# Patient Record
Sex: MALE | Race: WHITE | NOT HISPANIC OR LATINO | Employment: OTHER | URBAN - METROPOLITAN AREA
[De-identification: names, ages, dates, MRNs, and addresses within clinical notes are randomized per-mention and may not be internally consistent; named-entity substitution may affect disease eponyms.]

---

## 2023-03-24 DIAGNOSIS — Z00.00 ENCOUNTER FOR GENERAL ADULT MEDICAL EXAMINATION WITHOUT ABNORMAL FINDINGS: ICD-10-CM

## 2023-03-24 RX ORDER — METOPROLOL TARTRATE 25 MG/1
TABLET, FILM COATED ORAL
Qty: 90 TABLET | Refills: 1 | Status: SHIPPED | OUTPATIENT
Start: 2023-03-24

## 2023-03-24 RX ORDER — AMLODIPINE BESYLATE 10 MG/1
TABLET ORAL
Qty: 90 TABLET | Refills: 1 | Status: SHIPPED | OUTPATIENT
Start: 2023-03-24

## 2024-06-05 ENCOUNTER — OFFICE VISIT (OUTPATIENT)
Dept: DERMATOLOGY | Facility: CLINIC | Age: 69
End: 2024-06-05
Payer: MEDICARE

## 2024-06-05 ENCOUNTER — OFFICE VISIT (OUTPATIENT)
Dept: OTOLARYNGOLOGY | Facility: CLINIC | Age: 69
End: 2024-06-05
Payer: MEDICARE

## 2024-06-05 VITALS — WEIGHT: 140 LBS | BODY MASS INDEX: 23.32 KG/M2 | HEIGHT: 65 IN

## 2024-06-05 DIAGNOSIS — L30.4 INTERTRIGO: ICD-10-CM

## 2024-06-05 DIAGNOSIS — K21.9 LARYNGOPHARYNGEAL REFLUX (LPR): ICD-10-CM

## 2024-06-05 DIAGNOSIS — J34.89 NASAL CRUSTING: ICD-10-CM

## 2024-06-05 DIAGNOSIS — J39.2 LESION OF THROAT: Primary | ICD-10-CM

## 2024-06-05 DIAGNOSIS — L57.0 ACTINIC KERATOSES: Primary | ICD-10-CM

## 2024-06-05 PROCEDURE — 1160F RVW MEDS BY RX/DR IN RCRD: CPT | Performed by: NURSE PRACTITIONER

## 2024-06-05 PROCEDURE — 17003 DESTRUCT PREMALG LES 2-14: CPT | Performed by: STUDENT IN AN ORGANIZED HEALTH CARE EDUCATION/TRAINING PROGRAM

## 2024-06-05 PROCEDURE — 99214 OFFICE O/P EST MOD 30 MIN: CPT | Performed by: NURSE PRACTITIONER

## 2024-06-05 PROCEDURE — 99204 OFFICE O/P NEW MOD 45 MIN: CPT | Performed by: STUDENT IN AN ORGANIZED HEALTH CARE EDUCATION/TRAINING PROGRAM

## 2024-06-05 PROCEDURE — 1159F MED LIST DOCD IN RCRD: CPT | Performed by: NURSE PRACTITIONER

## 2024-06-05 PROCEDURE — 17000 DESTRUCT PREMALG LESION: CPT | Performed by: STUDENT IN AN ORGANIZED HEALTH CARE EDUCATION/TRAINING PROGRAM

## 2024-06-05 PROCEDURE — 1159F MED LIST DOCD IN RCRD: CPT | Performed by: STUDENT IN AN ORGANIZED HEALTH CARE EDUCATION/TRAINING PROGRAM

## 2024-06-05 PROCEDURE — 31231 NASAL ENDOSCOPY DX: CPT | Performed by: NURSE PRACTITIONER

## 2024-06-05 RX ORDER — MICONAZOLE NITRATE 2 %
POWDER (GRAM) TOPICAL DAILY
Qty: 85 G | Refills: 11 | Status: SHIPPED | OUTPATIENT
Start: 2024-06-05

## 2024-06-05 RX ORDER — ALPRAZOLAM 0.5 MG/1
0.5 TABLET ORAL DAILY PRN
COMMUNITY

## 2024-06-05 RX ORDER — GABAPENTIN 100 MG/1
100 CAPSULE ORAL 2 TIMES DAILY
COMMUNITY
Start: 2024-04-05 | End: 2024-07-04

## 2024-06-05 RX ORDER — MUPIROCIN 20 MG/G
OINTMENT TOPICAL
Qty: 30 G | Refills: 0 | Status: SHIPPED | OUTPATIENT
Start: 2024-06-05

## 2024-06-05 RX ORDER — OMEPRAZOLE 20 MG/1
20 CAPSULE, DELAYED RELEASE ORAL
Qty: 30 CAPSULE | Refills: 3 | Status: SHIPPED | OUTPATIENT
Start: 2024-06-05 | End: 2024-10-03

## 2024-06-05 RX ORDER — HYDROCORTISONE 25 MG/G
CREAM TOPICAL 2 TIMES DAILY
Qty: 30 G | Refills: 3 | Status: SHIPPED | OUTPATIENT
Start: 2024-06-05

## 2024-06-05 RX ORDER — DORZOLAMIDE HYDROCHLORIDE AND TIMOLOL MALEATE 20; 5 MG/ML; MG/ML
1 SOLUTION/ DROPS OPHTHALMIC 2 TIMES DAILY
COMMUNITY
Start: 2024-05-24

## 2024-06-05 RX ORDER — KETOCONAZOLE 20 MG/G
CREAM TOPICAL 2 TIMES DAILY
Qty: 30 G | Refills: 11 | Status: SHIPPED | OUTPATIENT
Start: 2024-06-05

## 2024-06-05 ASSESSMENT — DERMATOLOGY QUALITY OF LIFE (QOL) ASSESSMENT
RATE HOW BOTHERED YOU ARE BY SYMPTOMS OF YOUR SKIN PROBLEM (EG, ITCHING, STINGING BURNING, HURTING OR SKIN IRRITATION): 1
DATE THE QUALITY-OF-LIFE ASSESSMENT WAS COMPLETED: 66996
RATE HOW BOTHERED YOU ARE BY EFFECTS OF YOUR SKIN PROBLEMS ON YOUR ACTIVITIES (EG, GOING OUT, ACCOMPLISHING WHAT YOU WANT, WORK ACTIVITIES OR YOUR RELATIONSHIPS WITH OTHERS): 1
ARE THERE EXCLUSIONS OR EXCEPTIONS FOR THE QUALITY OF LIFE ASSESSMENT: NO
WHAT SINGLE SKIN CONDITION LISTED BELOW IS THE PATIENT ANSWERING THE QUALITY-OF-LIFE ASSESSMENT QUESTIONS ABOUT: ACTINIC KERATOSIS
RATE HOW EMOTIONALLY BOTHERED YOU ARE BY YOUR SKIN PROBLEM (FOR EXAMPLE, WORRY, EMBARRASSMENT, FRUSTRATION): 2

## 2024-06-05 ASSESSMENT — PATIENT HEALTH QUESTIONNAIRE - PHQ9
1. LITTLE INTEREST OR PLEASURE IN DOING THINGS: NOT AT ALL
2. FEELING DOWN, DEPRESSED OR HOPELESS: NOT AT ALL
SUM OF ALL RESPONSES TO PHQ9 QUESTIONS 1 AND 2: 0

## 2024-06-05 ASSESSMENT — DERMATOLOGY PATIENT ASSESSMENT
ARE YOU AN ORGAN TRANSPLANT RECIPIENT: NO
DO YOU USE A TANNING BED: NO
DO YOU HAVE ANY NEW OR CHANGING LESIONS: NO
HAVE YOU HAD OR DO YOU HAVE A STAPH INFECTION: NO
HAVE YOU HAD OR DO YOU HAVE VASCULAR DISEASE: NO

## 2024-06-05 ASSESSMENT — ITCH NUMERIC RATING SCALE: HOW SEVERE IS YOUR ITCHING?: 8

## 2024-06-05 ASSESSMENT — PATIENT GLOBAL ASSESSMENT (PGA): PATIENT GLOBAL ASSESSMENT: PATIENT GLOBAL ASSESSMENT:  1 - CLEAR

## 2024-06-05 NOTE — PROGRESS NOTES
Subjective     Antoine Head is a 68 y.o. male who presents for the following: Hidradenitis Suppurativa (Pt states itchy and painful lesions on genitals usually go with otc cortizone for years but currently hasn't resolved. ).     Review of Systems:  No other skin or systemic complaints other than what is documented elsewhere in the note.    The following portions of the chart were reviewed this encounter and updated as appropriate:          Skin Cancer History  No skin cancer on file.      Specialty Problems    None       Objective   Well appearing patient in no apparent distress; mood and affect are within normal limits.    A focused skin examination was performed. All findings within normal limits unless otherwise noted below.    Assessment/Plan   1. Actinic keratoses (9)  Mid Parietal Scalp (8), Right Lower Back  Erythematous gritty macule(s)    Lesions are due to chronic, cumulative sun damage over time and are pre-cancerous. They have a risk of developing into squamous cell carcinoma and therefore treatment recommendations were offered and discussed with the patient. Discussed LN2 & topical chemotherapy creams, risks and benefits of each. The risks and benefits of LN2 were reviewed including incomplete removal, crusting, blister hypo and/or hyperpigmentation, scarring. The patient elected for LN2 today.    Destr of lesion - Mid Parietal Scalp (8), Right Lower Back  Complexity: simple    Destruction method: cryotherapy    Informed consent: discussed and consent obtained    Lesion destroyed using liquid nitrogen: Yes    Cryotherapy cycles:  1  Outcome: patient tolerated procedure well with no complications    Post-procedure details: wound care instructions given      2. Intertrigo  Left Inguinal Area, Right Inguinal Area  Macerated pink patches in bilateral inguinal fold    Discussed diagnosis, chronic nature of condition  Related to irritation due to friction and sweat  Usually with yeast superinfection    Start hydrocortisone 2.5% cream and ketoconazole 2% cream mix 1:1 and apply twice daily until clear  Then use miconazole powder daily to prevent recurrence    Related Procedures  Follow Up In Dermatology - Established Patient    Related Medications  hydrocortisone 2.5 % cream  Apply topically 2 times a day. Mix 1:1 with ketoconazole cream and apply twice daily until rash is clear    ketoconazole (NIZOral) 2 % cream  Apply topically 2 times a day. Mix 1:1 with hydrocortisone cream and apply twice daily until rash is clear    miconazole (Micotin) 2 % powder  Apply topically once daily. To skin folds

## 2024-06-05 NOTE — PATIENT INSTRUCTIONS
"Today you were evaluated by Leonor Funk CNP.    Please follow-up as needed. If you have any questions or concerns, please contact my office at [phone].     Resume omeprazole 30 minutes prior to breakfast daily.    - Begin Mupirocin ointment 3 times daily FOR 2-4 WEEKS as directed. Use the pads of your fingers to apply the ointment and then sniff back gently. Do not use a cue tip or finger nail to place the ointment as this can cause further trauma. IF THE PRESCRIBED OINTMENT IS TOO EXPENSIVE THEN JUST USE OVER THE COUNTER BACITRACIN OR TRIPLE ANTIBIOTIC OINTMENT.      LARYNGOPHARYNGEAL REFLUX (LPR) OR SILENT REFLUX  A common cause of hoarseness or voice changes is gastroesophageal reflux disease (GERD). GERD occurs when stomach acid flows back up into the esophagus (swallowing tube). When the acid reaches the throat, it is called laryngopharyngeal reflux (LPR) or silent reflux. It is called \"silent\" because many people with LPR have no heartburn or stomach upset.    Possible Signs and Symptoms:  Hoarseness, cough  Sensation of lump in the throat, Vocal fold swelling and irritation  Frequent throat clearing, Vocal fold lesions or ulcerations  Mucous sticking in the throat, Worsening of asthma  Low grade sore throat, Worsening of sinus drainage or post nasal drip  Lifestyle changes  · If you are overweight, talk with your health care provider about losing weight.   · If you smoke, quit! Your health care provider may have ideas to help you quit.   Dietary guidelines  · Eat smaller, more frequent meals rather than large one.   · Avoid food or liquids for 2-3 hours before lying down (no bedtime snacks!)   · Avoid or limit the following:   Caffeinated products: coffee, tea, sodas, chocolate  Red sauces and salsa  Fatty, fried, or greasy foods  Citric juices: orange, grapefruit  Spicy foods  Mints: peppermint, spearmint  Alcohol    Physical measures  · Elevate the head of the bed 6 inches by placing blocks or " thick books under the legs of the head of the bed. Using extra pillows is NOT a good alternative.   · Avoid eating ANYTHING for 3-4 hours before bedtime.   · Avoid bending forward at the waist.   · Avoid wearing tight fitting clothing.   Antireflux medications if recommended or prescribed need to be taken on an empty stomach, 30 minutes before meals. These medications act by preventing acid production, not by neutralizing acid already present in the stomach.    Changes in voice and hoarseness can be the result of a number of different causes. One of the more common causes is gastroesophageal reflux disease (GERD). GERD occurs when stomach acid regurgitates back up in the esophagus (swallowing tube). When the acid reaches the throat we refer to this as laryngopharyngeal reflux (LPR). This reflux can cause inflammation and swelling in the throat or in the larynx (voice box) and can result in a number of different symptoms. These include mild hoarseness which is typically worse in the morning, a sense of a foreign body or lump in the throat, a sense of mucous sticking, a need to frequently clear the throat. Oftentimes, LPR irritation in the throat and larynx is mistaken for sinus drainage because of the sensation of mucous and post nasal drip.    LPR tends to become more prominent as people age, is often related to the timing and type of the diet, and may be the source of chronic symptoms. LPR reflux may also be the cause of low grade sorethroat and chronic cough, particularly cough that wakes people up in the middle of the night.    Approximately 50% of people with significant LPR have no symptoms of heartburn or stomach upset.    The diagnosis of LPR as a cause of throat symptoms is usually based on classic symptoms and physical findings of inflammation in the throat in locations consistent with LPR (back part of the voicebox). Often the patient is treated for LPR without any further testing. More significant testing  is usually not needed. However, on some occasion, some additional tests may be required such as a swallowing study with barium, an endoscopic evaluation of the esophagus and stomach, or a probe that measures acidity (pH) in the throat and esophagus.    The most important treatment of LPR is dietary control. A somewhat bland diet, smaller but more frequent meals, avoidance of alcohol, tobacco and caffeine, and not eating within 3-4 hours of bedtime are the most important factors. Avoidance of acidic and fatty foods and mint are also important. Elevation of the head of the bed and avoidance of tight, binding clothing is of value. A person with reflux who is overweight should reduce weight, and reducing stress also frequently improves the symptoms.  Regular use of a recommended or prescribed medication for at least 2 months while observing dietary control is critical. Humidification, hydration, mucous thinners and avoidance of throat clearing are all of value for those people who have significant throat symptoms from gastroesophageal reflux.  Untreated LPR can lead to chronic swelling of the vocal folds, ulcerations of the vocal folds and formation of masses known as granulomas. LPR can also make asthma worse.

## 2024-06-05 NOTE — PROGRESS NOTES
Subjective   Patient ID: Antoine Head is a 68 y.o. male who presents for Follow-up and Sinusitis.  HPI  Antoine Head is a 68 y.o. old male   presenting with complaints of sinus and nose problems that began several years ago. He notes chronic crusting in his nose. However, over the last month, he has had some bleeding on the right side.   The patient describes the sinus/nose problems as gradual onset of difficulty with his sense of taste over the last month. Denies any viral illness. He does have nasal drainage, mostly posterior. He has intermittent obstruction. Patient denies facial pressure, rhinorrhea, facial pain, periorbital edema, throat clearing, hoarseness, and loss of smell. The patient denies epistaxis. The patient has not taken medications to alleviate the problem. The patient has not tried nasal saline irrigations. Does not have a history of allergic rhinitis or allergies. They deny a history of aspirin sensitivity. They report 0 sinus infections per year requiring antibiotic treatment. They deny recent antibiotic usage.   History of prior nasal/sinus surgery or procedure: Denies    I have also reviewed prior note from Dr. Patrick Low dated 2/16/22 and this is contributing to my history and assessment.     Review of Systems  Review of systems is negative for constitutional, ophthalmological, cardiac, pulmonary, renal, gastrointestinal, musculoskeletal, mental health, endocrine, or neurologic disorders (except as listed in the HPI, PMH, and Problem List).     Objective   Physical Exam  CONSTITUTIONAL: Vital signs reviewed. Patient appears well developed and well nourished.   GENERAL: this is a healthy appearing male who appears stated age. The patient is alert and appropriately verbally conversant without hoarseness. This patient is in no apparent distress.   FACE: The face was inspected and no cutaneous masses or lesions were visualized. There was no erythema or edema noted. Facial movement was  symmetric. No skin lesions were detected. There was no sinus tenderness elicited. TMJ crepitus absent.   EYES: Extra-ocular muscle function was intact. No nystagmus was observed. Pupils were equal.   CRANIAL NERVES: Cranial nerves II, III, IV, and VI were noted to be intact via extra-ocular muscle movement testing. Cranial nerve VII noted to be intact and symmetric by facial movement. Cranial nerves IX and X noted to be intact by gag reflex and palatal movement. Cranial nerve XII noted to be intact by active and symmetric tongue movement.   NOSE: Examination of the nose revealed the nasal dorsum to be midline. Intranasal exam reveals the septum is deviated right. The inferior turbinates were healthy. No masses, polyps, mucopus, or other lesion on anterior rhinoscopy. See below procedure note as applicable for further exam.  ORAL CAVITY: Examination of the oral cavity revealed no mass lesions nor infection. The palate was noted to be intact. The tongue exhibited normal mobility. Mucosa was moist without lesion. The lips were free of lesion. Gums were free of inflammation. Dentition: normal without obvious infection or inflammation  OROPHARYNX: The oral pharynx was free of mass lesion or mucosal abnormality. The palate was noted to be without lesion. The uvula was normal appearing. The tonsils were Absent. The posterior pharynx is with cobblestoning.  EARS: Examination of the ears revealed that the auricles were normally formed with no lesions. The external auditory canals were normal. The tympanic membranes were intact.  There is no inflammation visualized.   NECK: Visualization and palpation of the neck revealed no mass lesions. No skin lesions or inflammatory processes were detected. The cervical musculature was normal to palpation.   CERVICAL LYMPHATICS: There were no palpable lymph nodes in the posterior triangle, submandibular triangle, jugulodigastric region, or central neck.  RESPIRATORY: Normal inspiration  and expiration and chest wall expansion, no use of accessory muscles to breathe, no stridor.  NEUROLOGICAL: Patient is ambulatory without assist. Mentation is clear. Answering questions appropriately.     Nasal / sinus endoscopy    Date/Time: 6/5/2024 3:04 PM    Performed by: LEO Olivarez  Authorized by: LEO Olivarez    Consent:     Consent obtained:  Verbal    Consent given by:  Patient    Risks discussed:  Bleeding, infection and pain    Alternatives discussed:  No treatment, observation and delayed treatment  Procedure details:     Indications: assessment of airway and sino-nasal symptoms      Medication:  Afrin and Ronny-Synephrine 2%    Instrument: flexible fiberoptic nasal endoscope      Scope location: bilateral nare    Post-procedure details:     Patient tolerance of procedure:  Tolerated well, no immediate complications  Comments:      Findings: After topical decongestion with decongestant and anesthetic spray, nasal endoscopy was performed using an endoscope. The septum was deviated right. There is a crust along the right anterior septum. The inferior turbinates were hypertrophic.  The middle turbinates appeared healthy, the middle meatus is free of purulence, masses, lesions or polyps. The superior meatus and sphenoethmoid recess are clear bilaterally. The nasal passageway is patent. The nasopharynx was clear. On view of the larynx, there is cobblestoning in the posterior pharynx. There appears to be several cysts in the left lingual tonsillar tissue. Otherwise, the vocal cords and movement are normal. There were no complications and the patient tolerated the procedure well.        Assessment/Plan     Antoine Head is a 68 y.o. year old male with symptoms of post-nasal drainage, nasal crusting and clinical exam consistent with laryngopharyngeal reflux (LPR). He also has a slight right nasal septal deviation and a crust to the anterior aspect of the nasal septum and left  middle turbinate. Rec. Mupirocin ointment and omeprazole for the reflux. Discussed follow up with Dr. Low regarding persisting throat concerns.       PLAN:  1) Flexible laryngoscopy today. Evidence of erythema and cobblestoning to the posterior pharynx. Several cysts in the base of the tongue.  2) Pt was extensively counseled regarding lifestyle and dietary precautions that can be performed to improve LPR. This includes eating smaller more frequent meals, avoiding triggers such as caffeine, late night snacks, peppermint, spicy or citrus foods and drinks high in acid content. The pt was also apprised that elevating the head of bed may help. The patient was counseled on avoiding as much as possible NSAIDS and if taking them be sure to do so with a meal.  3) Omeprazole was also prescribed to be taken 30 minutes before meals to help limit the acid production.  4) I also recommended he begin mupirocin ointment, twice daily, by placing on the pad of the thumb and swiping into the nostril. Advised against the use of Q-tips in the nose.   5) Plan to see the patient back in 1-2 months with Dr. Low regarding persisting throat and taste concerns.

## 2024-10-07 ENCOUNTER — TELEPHONE (OUTPATIENT)
Dept: DERMATOLOGY | Facility: CLINIC | Age: 69
End: 2024-10-07
Payer: MEDICARE

## 2024-10-09 ENCOUNTER — APPOINTMENT (OUTPATIENT)
Dept: DERMATOLOGY | Facility: CLINIC | Age: 69
End: 2024-10-09
Payer: MEDICARE

## 2024-11-04 ENCOUNTER — APPOINTMENT (OUTPATIENT)
Dept: OTOLARYNGOLOGY | Facility: CLINIC | Age: 69
End: 2024-11-04
Payer: MEDICARE

## 2024-11-04 VITALS — BODY MASS INDEX: 24.09 KG/M2 | WEIGHT: 144.6 LBS | HEIGHT: 65 IN | TEMPERATURE: 96.6 F

## 2024-11-04 DIAGNOSIS — J39.2 LESION OF THROAT: Primary | ICD-10-CM

## 2024-11-04 DIAGNOSIS — R07.0 THROAT PAIN: ICD-10-CM

## 2024-11-04 DIAGNOSIS — K21.9 LARYNGOPHARYNGEAL REFLUX (LPR): ICD-10-CM

## 2024-11-04 PROCEDURE — 3008F BODY MASS INDEX DOCD: CPT | Performed by: STUDENT IN AN ORGANIZED HEALTH CARE EDUCATION/TRAINING PROGRAM

## 2024-11-04 PROCEDURE — 31575 DIAGNOSTIC LARYNGOSCOPY: CPT | Performed by: STUDENT IN AN ORGANIZED HEALTH CARE EDUCATION/TRAINING PROGRAM

## 2024-11-04 PROCEDURE — 1159F MED LIST DOCD IN RCRD: CPT | Performed by: STUDENT IN AN ORGANIZED HEALTH CARE EDUCATION/TRAINING PROGRAM

## 2024-11-04 PROCEDURE — 99213 OFFICE O/P EST LOW 20 MIN: CPT | Performed by: STUDENT IN AN ORGANIZED HEALTH CARE EDUCATION/TRAINING PROGRAM

## 2024-11-04 ASSESSMENT — PATIENT HEALTH QUESTIONNAIRE - PHQ9
2. FEELING DOWN, DEPRESSED OR HOPELESS: NOT AT ALL
SUM OF ALL RESPONSES TO PHQ9 QUESTIONS 1 AND 2: 0
1. LITTLE INTEREST OR PLEASURE IN DOING THINGS: NOT AT ALL

## 2024-11-05 NOTE — PROGRESS NOTES
"HEAD AND NECK SURGERY CONSULT  [facility]    Referring Provider: Blessing    HPI  I had the pleasure of seeing Antoine Head as a consultation today for evaluation of throat pain.     The patient reports a history of intermittent throat pain, this has been present on and off for many years. He follows with Leonor Funk ([place]), recommended to do PPI for GERD and lifestyle adjustments. Also noted pharyngeal cobblestoning and base of tongue cysts on last scope exam in June. Saw Dr Low in 2022. No history of cancer. Smokes daily, has struggled to quit and is motivated. Has not been taking PPI. Prior issues with bleeding from is right nostril, this has improved with bactroban/vaseline.    The patient denies having prior trauma or surgery to their head and neck. There is not a personal or family history of blood clots, easy bleeding or bruising, or anesthesia concerns. The patient's occupation is retired.    Tobacco use: The patient  reports that he has been smoking cigarettes. He has never used smokeless tobacco.   Alcohol Use: The patient  has no history on file for alcohol use.     Physical Examination  Vitals:  Temp 35.9 °C (96.6 °F) (Temporal)   Ht 1.651 m (5' 5\")   Wt 65.6 kg (144 lb 9.6 oz)   BMI 24.06 kg/m²   General: Examination reveals a well-developed, well-nourished patient in no apparent distress.  The patient has no audible dysphonia, stridor or airway distress.  The patient is oriented, alert and responsive.    Ears: Bilateral EAC patent, TM visualized and intact, no middle ear effusion   Face: no lesions of the face, symmetric movement  Oral Cavity:  The patient is able to open the mouth widely without trismus.  The floor of mouth and oral tongue are soft, and no mucosal abnormalities are noted on the lips or within the oral cavity.  The oral tongue is fully mobile and midline on protrusion.    Oropharynx: There are no mucosal abnormalities noted within the oropharynx.  The soft " palate elevates symmetrically, the tonsils and base of tongue are normal to inspection and palpation.       Salivary glands: There are no palpable masses of the parotid or submandibular glands.   Neuro: Cranial nerves 2-12 are without obvious abnormality.  Neck: The neck is soft and symmetric.  There is no palpable adenopathy.     Procedure Note:  Diagnostic Flexible Laryngoscopy (33360)  Indication: patient symptoms requiring evaluation of pharyngeal/laryngeal/hypopharyngeal structures  Surgeon: Leslie Garner MD  Informed Consent: The procedure, risks, and benefits were discussed with the patient and verbal consent obtained to proceed.   Procedure: Topical anesthesia (lidocaine) was used to spray the nasal mucosa.  A flexible laryngoscopy was inserted through the nasal cavity. The scope was then passed through the nasopharynx, oropharynx, and supraglottis. The vallecula, hypopharynx, supraglottis, glottis and subglottis were then visualized. The scope was then removed. The patient tolerated the procedure with no complications.     Findings: All of the visualized areas noted above were noted to be without evidence of lesions, ulcerations or masses.  The vocal folds adduct and abduct fully. Healing right septal excoriation, no bleeding. Bulky base of tongue without discrete lesion. Pharyngeal cobblestoning with prominence in nasopharynx    Attestation: I performed the procedure in its entirety, or was present with the resident/fellow for the entirety of the procedure.      DATA REVIEWED:  Review of prior medical records: I reviewed the patient's medical records which included a clinic note from Leonor Funk dated 6/5/24 in which she detailed findings from flex scope and treatment plan.    ASSESSMENT and PLAN:    Throat pain  GERD  - discussed the reflux could be a contributor to his throat pain. Recommend continued PPI and reflux precautions  - Will obtain CT neck given continued pain and smoking  history  - Follow up in a few weeks for re-evaluation. If persistent nasopharynx fullness may consider biopsy    Epistaxis - resolved  - encouraged continued humidification and vaseline     Leslie Garner MD

## 2024-11-12 ENCOUNTER — LAB (OUTPATIENT)
Dept: LAB | Facility: LAB | Age: 69
End: 2024-11-12
Payer: MEDICARE

## 2024-11-12 DIAGNOSIS — J39.2 LESION OF THROAT: ICD-10-CM

## 2024-11-12 PROCEDURE — 84520 ASSAY OF UREA NITROGEN: CPT

## 2024-11-12 PROCEDURE — 36415 COLL VENOUS BLD VENIPUNCTURE: CPT

## 2024-11-12 PROCEDURE — 82565 ASSAY OF CREATININE: CPT

## 2024-11-13 LAB
BUN SERPL-MCNC: 20 MG/DL (ref 6–23)
CREAT SERPL-MCNC: 1.23 MG/DL (ref 0.5–1.3)
EGFRCR SERPLBLD CKD-EPI 2021: 64 ML/MIN/1.73M*2

## 2024-11-20 ENCOUNTER — HOSPITAL ENCOUNTER (OUTPATIENT)
Dept: RADIOLOGY | Facility: CLINIC | Age: 69
Discharge: HOME | End: 2024-11-20
Payer: MEDICARE

## 2024-11-20 DIAGNOSIS — J39.2 LESION OF THROAT: ICD-10-CM

## 2024-11-20 PROCEDURE — 70491 CT SOFT TISSUE NECK W/DYE: CPT | Performed by: RADIOLOGY

## 2024-11-20 PROCEDURE — 70491 CT SOFT TISSUE NECK W/DYE: CPT

## 2024-11-20 PROCEDURE — 2550000001 HC RX 255 CONTRASTS: Performed by: STUDENT IN AN ORGANIZED HEALTH CARE EDUCATION/TRAINING PROGRAM

## 2024-12-16 ENCOUNTER — APPOINTMENT (OUTPATIENT)
Dept: OTOLARYNGOLOGY | Facility: CLINIC | Age: 69
End: 2024-12-16
Payer: MEDICARE

## 2025-01-06 ENCOUNTER — APPOINTMENT (OUTPATIENT)
Dept: OTOLARYNGOLOGY | Facility: CLINIC | Age: 70
End: 2025-01-06
Payer: MEDICARE

## 2025-01-20 ENCOUNTER — APPOINTMENT (OUTPATIENT)
Dept: OTOLARYNGOLOGY | Facility: CLINIC | Age: 70
End: 2025-01-20
Payer: MEDICARE

## 2025-02-17 ENCOUNTER — APPOINTMENT (OUTPATIENT)
Dept: OTOLARYNGOLOGY | Facility: CLINIC | Age: 70
End: 2025-02-17
Payer: MEDICARE

## 2025-03-03 ENCOUNTER — APPOINTMENT (OUTPATIENT)
Dept: OTOLARYNGOLOGY | Facility: CLINIC | Age: 70
End: 2025-03-03
Payer: MEDICARE

## 2025-03-17 ENCOUNTER — APPOINTMENT (OUTPATIENT)
Dept: OTOLARYNGOLOGY | Facility: CLINIC | Age: 70
End: 2025-03-17
Payer: MEDICARE

## 2025-03-31 ENCOUNTER — APPOINTMENT (OUTPATIENT)
Dept: OTOLARYNGOLOGY | Facility: CLINIC | Age: 70
End: 2025-03-31
Payer: MEDICARE

## 2025-04-07 ENCOUNTER — APPOINTMENT (OUTPATIENT)
Dept: OTOLARYNGOLOGY | Facility: CLINIC | Age: 70
End: 2025-04-07
Payer: MEDICARE

## 2025-04-07 DIAGNOSIS — J39.2 LESION OF NASOPHARYNX: Primary | ICD-10-CM

## 2025-04-07 DIAGNOSIS — K21.9 GASTROESOPHAGEAL REFLUX DISEASE, UNSPECIFIED WHETHER ESOPHAGITIS PRESENT: ICD-10-CM

## 2025-04-07 DIAGNOSIS — Z72.0 TOBACCO USE: ICD-10-CM

## 2025-04-07 PROCEDURE — 1159F MED LIST DOCD IN RCRD: CPT | Performed by: STUDENT IN AN ORGANIZED HEALTH CARE EDUCATION/TRAINING PROGRAM

## 2025-04-07 PROCEDURE — 31575 DIAGNOSTIC LARYNGOSCOPY: CPT | Performed by: STUDENT IN AN ORGANIZED HEALTH CARE EDUCATION/TRAINING PROGRAM

## 2025-04-07 PROCEDURE — 99213 OFFICE O/P EST LOW 20 MIN: CPT | Performed by: STUDENT IN AN ORGANIZED HEALTH CARE EDUCATION/TRAINING PROGRAM

## 2025-04-07 ASSESSMENT — PATIENT HEALTH QUESTIONNAIRE - PHQ9
1. LITTLE INTEREST OR PLEASURE IN DOING THINGS: NOT AT ALL
SUM OF ALL RESPONSES TO PHQ9 QUESTIONS 1 AND 2: 0
2. FEELING DOWN, DEPRESSED OR HOPELESS: NOT AT ALL

## 2025-04-07 NOTE — PROGRESS NOTES
HEAD AND NECK SURGERY FOLLOW UP  [facility]    HPI  I had the pleasure of seeing Antoine Head as a follow up today.     Stable throat pain, previously saw Leonor DIEHL) and Dr Low in 2022. No history of cancer. Smokes daily, has struggled to quit and is motivated. Has not been taking PPI, has known GERD and intermittent throat pain. Recent illness and feels pain worsened. No epistaxis since last visit. No new concerns.    Tobacco use: The patient  reports that he has been smoking cigarettes. He has never used smokeless tobacco.   Alcohol Use: The patient  has no history on file for alcohol use.     Physical Examination  General: Examination reveals a well-developed, well-nourished patient in no apparent distress.  The patient has no audible dysphonia, stridor or airway distress.  The patient is oriented, alert and responsive.    Ears: Bilateral EAC patent, TM visualized and intact, no middle ear effusion   Face: no lesions of the face, symmetric movement  Oral Cavity:  The patient is able to open the mouth widely without trismus.  The floor of mouth and oral tongue are soft, and no mucosal abnormalities are noted on the lips or within the oral cavity.  The oral tongue is fully mobile and midline on protrusion.    Oropharynx: There are no mucosal abnormalities noted within the oropharynx.  The soft palate elevates symmetrically, the tonsils and base of tongue are normal to inspection and palpation.       Salivary glands: There are no palpable masses of the parotid or submandibular glands.   Neuro: Cranial nerves 2-12 are without obvious abnormality.  Neck: The neck is soft and symmetric.  There is no palpable adenopathy.     Procedure Note:  Diagnostic Flexible Laryngoscopy (12994)  Indication: patient symptoms requiring evaluation of pharyngeal/laryngeal/hypopharyngeal structures  Surgeon: Leslie Garner MD  Informed Consent: The procedure, risks, and benefits were discussed with  the patient and verbal consent obtained to proceed.   Procedure: Topical anesthesia (lidocaine) was used to spray the nasal mucosa.  A flexible laryngoscopy was inserted through the nasal cavity. The scope was then passed through the nasopharynx, oropharynx, and supraglottis. The vallecula, hypopharynx, supraglottis, glottis and subglottis were then visualized. The scope was then removed. The patient tolerated the procedure with no complications.     Findings: All of the visualized areas noted above were examined.  The vocal folds adduct and abduct fully.  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      DATA REVIEWED:  Review of prior medical records: I reviewed the patient's medical records which included a clinic note from Leonor Funk dated 6/5/24 in which she detailed findings from flex scope and treatment plan.    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 If changes or grows may consider biopsy in the operating room    Epistaxis - resolved  - encouraged continued humidification and vaseline     3. Throat pain/GERD  - encouraged hydration  - continue PPI  - no signs of lesion or concerns in larynx today    4. Tobacco use  - encouraged cessation, offered nicotine patches. He deferred at this time    Leslie Garner MD

## 2025-06-03 ENCOUNTER — APPOINTMENT (OUTPATIENT)
Dept: OTOLARYNGOLOGY | Facility: CLINIC | Age: 70
End: 2025-06-03
Payer: MEDICARE

## 2025-06-03 VITALS — BODY MASS INDEX: 23.73 KG/M2 | HEIGHT: 64 IN | WEIGHT: 139 LBS

## 2025-06-03 DIAGNOSIS — R43.0 ANOSMIA: ICD-10-CM

## 2025-06-03 DIAGNOSIS — R09.81 NASAL CONGESTION: ICD-10-CM

## 2025-06-03 DIAGNOSIS — K13.21 ORAL LEUKOPLAKIA: Primary | ICD-10-CM

## 2025-06-03 PROCEDURE — 99214 OFFICE O/P EST MOD 30 MIN: CPT | Performed by: OTOLARYNGOLOGY

## 2025-06-03 PROCEDURE — 1159F MED LIST DOCD IN RCRD: CPT | Performed by: OTOLARYNGOLOGY

## 2025-06-03 PROCEDURE — 1160F RVW MEDS BY RX/DR IN RCRD: CPT | Performed by: OTOLARYNGOLOGY

## 2025-06-03 PROCEDURE — 3008F BODY MASS INDEX DOCD: CPT | Performed by: OTOLARYNGOLOGY

## 2025-06-03 ASSESSMENT — PATIENT HEALTH QUESTIONNAIRE - PHQ9
SUM OF ALL RESPONSES TO PHQ9 QUESTIONS 1 AND 2: 0
2. FEELING DOWN, DEPRESSED OR HOPELESS: NOT AT ALL
1. LITTLE INTEREST OR PLEASURE IN DOING THINGS: NOT AT ALL

## 2025-06-03 NOTE — PROGRESS NOTES
"Reason For Consult  Mouth lesions    History Of Present Illness  Antoine Head is a 69 y.o. male presenting with concern for new mouth lesions. He notes white lesions with red base on the inside of his cheeks. Started noticing them after getting his partial dentures. Has had his dentures modified a few times. The lesions can be painful. They come and go. They dont bleed. No neck masses. He has intermittent sore throat for which he saw Dr. Garner recently. Scope was normal and advised smoking cessation and PPI.     He also reports loss of sense of smell and taste. This occurred after URI in February. He smells a \"woody\" smell constantly and is now intermittently able to smell stronger smells. Complains of constant nasal dryness, congestion and post nasal drip. Has tried saline and flonase in past and says they dry him out more.     Past Medical History  He has no past medical history on file.    Surgical History  He has a past surgical history that includes Other surgical history (01/04/2021).     Social History  He reports that he has been smoking cigarettes. He has never used smokeless tobacco. No history on file for alcohol use and drug use.    Family History  Family History[1]     Allergies  Clindamycin and Simvastatin    Review of Systems  Negative except per HPI     Physical Exam  CONSTITUTIONAL:  No acute distress  VOICE:  No hoarseness or other abnormality  RESPIRATION:  Breathing comfortably, no stridor  CV:  No clubbing/cyanosis/edema in hands  EYES:  EOM intact, sclera normal  NEURO:  Alert and oriented times 3, Cranial nerves II-XII grossly intact and symmetric bilaterally  HEAD AND FACE:  Symmetric facial features, no masses or lesions, sinuses non-tender to palpation  SALIVARY GLANDS:  Parotid and submandibular glands normal bilaterally  EARS:  Normal external ears, external auditory canals, and TMs to otoscopy, normal hearing to whispered voice.  NOSE:  External nose midline, anterior rhinoscopy is " "normal with limited visualization to the anterior aspect of the interior turbinates, no bleeding or drainage, dry nasal mucosa with scabbing and scar tissue on right anterior septum  ORAL CAVITY/OROPHARYNX/LIPS:  Normal mucous membranes, normal floor of mouth/tongue/OP, faint leukoplakia in linear pattern along bilateral buccal mucosa, no masses or lesions  PHARYNGEAL WALLS:  No masses or lesions  NECK/LYMPH:  No LAD, no thyroid masses, trachea midline  SKIN:  Neck skin is without scar or injury  PSYCH:  Alert and oriented with appropriate mood and affect       Last Recorded Vitals  Height 1.626 m (5' 4\"), weight 63 kg (139 lb).    Relevant Results      CT neck from 11/4/24 reviewed which shows no concerning masses or lesions     Assessment/Plan     No concerning masses or lesions on oral exam today. Findings most consistent with callous formation from new dentures. Reassured patient of findings on exam     Will refer to rhinology colleagues for anosmia and nasal congestion. May be related to post viral syndrome and/or chronic sinusitis    Gauri Corea MD           The above resident note has been reviewed and confirmed.  Patient was seen and examined with the resident today.  Documentation has been reviewed.             [1] No family history on file.    "

## 2025-06-18 ENCOUNTER — APPOINTMENT (OUTPATIENT)
Dept: OTOLARYNGOLOGY | Facility: CLINIC | Age: 70
End: 2025-06-18
Payer: MEDICARE

## 2025-07-08 ENCOUNTER — APPOINTMENT (OUTPATIENT)
Dept: OTOLARYNGOLOGY | Facility: CLINIC | Age: 70
End: 2025-07-08
Payer: MEDICARE

## 2025-07-09 ENCOUNTER — APPOINTMENT (OUTPATIENT)
Dept: OTOLARYNGOLOGY | Facility: CLINIC | Age: 70
End: 2025-07-09
Payer: MEDICARE

## 2025-07-17 ENCOUNTER — APPOINTMENT (OUTPATIENT)
Dept: OTOLARYNGOLOGY | Facility: CLINIC | Age: 70
End: 2025-07-17
Payer: MEDICARE

## 2025-08-05 ENCOUNTER — APPOINTMENT (OUTPATIENT)
Dept: OTOLARYNGOLOGY | Facility: CLINIC | Age: 70
End: 2025-08-05
Payer: MEDICARE

## 2025-08-12 ENCOUNTER — APPOINTMENT (OUTPATIENT)
Dept: OTOLARYNGOLOGY | Facility: CLINIC | Age: 70
End: 2025-08-12
Payer: MEDICARE

## 2025-08-19 ENCOUNTER — APPOINTMENT (OUTPATIENT)
Dept: OTOLARYNGOLOGY | Facility: CLINIC | Age: 70
End: 2025-08-19
Payer: MEDICARE

## 2025-09-09 ENCOUNTER — APPOINTMENT (OUTPATIENT)
Dept: OTOLARYNGOLOGY | Facility: CLINIC | Age: 70
End: 2025-09-09
Payer: MEDICARE

## 2025-09-16 ENCOUNTER — APPOINTMENT (OUTPATIENT)
Dept: OTOLARYNGOLOGY | Facility: CLINIC | Age: 70
End: 2025-09-16
Payer: MEDICARE

## 2025-09-18 ENCOUNTER — APPOINTMENT (OUTPATIENT)
Dept: OTOLARYNGOLOGY | Facility: CLINIC | Age: 70
End: 2025-09-18
Payer: MEDICARE